# Patient Record
Sex: MALE | Race: WHITE | NOT HISPANIC OR LATINO | Employment: OTHER | URBAN - METROPOLITAN AREA
[De-identification: names, ages, dates, MRNs, and addresses within clinical notes are randomized per-mention and may not be internally consistent; named-entity substitution may affect disease eponyms.]

---

## 2024-07-01 ENCOUNTER — HOSPITAL ENCOUNTER (EMERGENCY)
Facility: HOSPITAL | Age: 64
Discharge: HOME | End: 2024-07-01
Attending: STUDENT IN AN ORGANIZED HEALTH CARE EDUCATION/TRAINING PROGRAM
Payer: MEDICAID

## 2024-07-01 VITALS
WEIGHT: 236.56 LBS | HEART RATE: 56 BPM | BODY MASS INDEX: 29.41 KG/M2 | SYSTOLIC BLOOD PRESSURE: 129 MMHG | TEMPERATURE: 98 F | DIASTOLIC BLOOD PRESSURE: 70 MMHG | OXYGEN SATURATION: 100 % | RESPIRATION RATE: 20 BRPM | HEIGHT: 75 IN

## 2024-07-01 DIAGNOSIS — R42 LIGHTHEADEDNESS: Primary | ICD-10-CM

## 2024-07-01 DIAGNOSIS — I48.0 PAROXYSMAL ATRIAL FIBRILLATION: ICD-10-CM

## 2024-07-01 DIAGNOSIS — R00.1 BRADYCARDIA: ICD-10-CM

## 2024-07-01 DIAGNOSIS — R79.89 ELEVATED SERUM CREATININE: ICD-10-CM

## 2024-07-01 LAB
ALBUMIN SERPL BCP-MCNC: 4.5 G/DL (ref 3.5–5.2)
ALP SERPL-CCNC: 76 U/L (ref 55–135)
ALT SERPL W/O P-5'-P-CCNC: 10 U/L (ref 10–44)
ANION GAP SERPL CALC-SCNC: 10 MMOL/L (ref 8–16)
AST SERPL-CCNC: 16 U/L (ref 10–40)
BASOPHILS # BLD AUTO: 0.05 K/UL (ref 0–0.2)
BASOPHILS NFR BLD: 0.5 % (ref 0–1.9)
BILIRUB SERPL-MCNC: 0.6 MG/DL (ref 0.1–1)
BNP SERPL-MCNC: 62 PG/ML (ref 0–99)
BUN SERPL-MCNC: 21 MG/DL (ref 8–23)
CALCIUM SERPL-MCNC: 9.8 MG/DL (ref 8.7–10.5)
CHLORIDE SERPL-SCNC: 105 MMOL/L (ref 95–110)
CO2 SERPL-SCNC: 24 MMOL/L (ref 23–29)
CREAT SERPL-MCNC: 1.7 MG/DL (ref 0.5–1.4)
DIFFERENTIAL METHOD BLD: ABNORMAL
EOSINOPHIL # BLD AUTO: 0.1 K/UL (ref 0–0.5)
EOSINOPHIL NFR BLD: 0.7 % (ref 0–8)
ERYTHROCYTE [DISTWIDTH] IN BLOOD BY AUTOMATED COUNT: 13.3 % (ref 11.5–14.5)
EST. GFR  (NO RACE VARIABLE): 45 ML/MIN/1.73 M^2
GLUCOSE SERPL-MCNC: 98 MG/DL (ref 70–110)
HCT VFR BLD AUTO: 42 % (ref 40–54)
HCV AB SERPL QL IA: NEGATIVE
HEP C VIRUS HOLD SPECIMEN: NORMAL
HGB BLD-MCNC: 14.3 G/DL (ref 14–18)
HIV 1+2 AB+HIV1 P24 AG SERPL QL IA: NEGATIVE
IMM GRANULOCYTES # BLD AUTO: 0.02 K/UL (ref 0–0.04)
IMM GRANULOCYTES NFR BLD AUTO: 0.2 % (ref 0–0.5)
INR PPP: 1 (ref 0.8–1.2)
LYMPHOCYTES # BLD AUTO: 0.9 K/UL (ref 1–4.8)
LYMPHOCYTES NFR BLD: 8.7 % (ref 18–48)
MCH RBC QN AUTO: 31.6 PG (ref 27–31)
MCHC RBC AUTO-ENTMCNC: 34 G/DL (ref 32–36)
MCV RBC AUTO: 93 FL (ref 82–98)
MONOCYTES # BLD AUTO: 0.5 K/UL (ref 0.3–1)
MONOCYTES NFR BLD: 4.8 % (ref 4–15)
NEUTROPHILS # BLD AUTO: 9 K/UL (ref 1.8–7.7)
NEUTROPHILS NFR BLD: 85.1 % (ref 38–73)
NRBC BLD-RTO: 0 /100 WBC
OHS QRS DURATION: 114 MS
OHS QTC CALCULATION: 477 MS
PLATELET # BLD AUTO: 243 K/UL (ref 150–450)
PMV BLD AUTO: 10.3 FL (ref 9.2–12.9)
POTASSIUM SERPL-SCNC: 4.2 MMOL/L (ref 3.5–5.1)
PROT SERPL-MCNC: 7.5 G/DL (ref 6–8.4)
PROTHROMBIN TIME: 11.7 SEC (ref 9–12.5)
RBC # BLD AUTO: 4.52 M/UL (ref 4.6–6.2)
SODIUM SERPL-SCNC: 139 MMOL/L (ref 136–145)
TROPONIN I SERPL DL<=0.01 NG/ML-MCNC: <0.006 NG/ML (ref 0–0.03)
WBC # BLD AUTO: 10.55 K/UL (ref 3.9–12.7)

## 2024-07-01 PROCEDURE — 83880 ASSAY OF NATRIURETIC PEPTIDE: CPT | Performed by: NURSE PRACTITIONER

## 2024-07-01 PROCEDURE — 87389 HIV-1 AG W/HIV-1&-2 AB AG IA: CPT | Performed by: EMERGENCY MEDICINE

## 2024-07-01 PROCEDURE — 99285 EMERGENCY DEPT VISIT HI MDM: CPT | Mod: 25

## 2024-07-01 PROCEDURE — 93005 ELECTROCARDIOGRAM TRACING: CPT

## 2024-07-01 PROCEDURE — 93010 ELECTROCARDIOGRAM REPORT: CPT | Mod: ,,, | Performed by: INTERNAL MEDICINE

## 2024-07-01 PROCEDURE — 86803 HEPATITIS C AB TEST: CPT | Performed by: EMERGENCY MEDICINE

## 2024-07-01 PROCEDURE — 80053 COMPREHEN METABOLIC PANEL: CPT | Performed by: NURSE PRACTITIONER

## 2024-07-01 PROCEDURE — 85610 PROTHROMBIN TIME: CPT | Performed by: NURSE PRACTITIONER

## 2024-07-01 PROCEDURE — 84484 ASSAY OF TROPONIN QUANT: CPT | Performed by: NURSE PRACTITIONER

## 2024-07-01 PROCEDURE — 85025 COMPLETE CBC W/AUTO DIFF WBC: CPT | Performed by: NURSE PRACTITIONER

## 2024-07-01 PROCEDURE — 25000003 PHARM REV CODE 250: Performed by: NURSE PRACTITIONER

## 2024-07-01 RX ORDER — AMIODARONE HYDROCHLORIDE 200 MG/1
200 TABLET ORAL
COMMUNITY

## 2024-07-01 RX ORDER — SODIUM CHLORIDE 9 MG/ML
1000 INJECTION, SOLUTION INTRAVENOUS
Status: COMPLETED | OUTPATIENT
Start: 2024-07-01 | End: 2024-07-01

## 2024-07-01 RX ORDER — APIXABAN 5 MG/1
5 TABLET, FILM COATED ORAL 2 TIMES DAILY
COMMUNITY

## 2024-07-01 RX ADMIN — SODIUM CHLORIDE 1000 ML: 9 INJECTION, SOLUTION INTRAVENOUS at 01:07

## 2024-07-01 NOTE — ED PROVIDER NOTES
"SCRIBE #1 NOTE: I, Yoni Jackie, am scribing for, and in the presence of, Ethan Barros MD. I have scribed the entire note.       History     Chief Complaint   Patient presents with    Loss of Consciousness     Near syncope today while shopping with "chest spasms". +sob and dizziness. Hx Afib. Takes Amiodarone and Eliquis daily.     Review of patient's allergies indicates:   Allergen Reactions    Penicillins          History of Present Illness     HPI    7/1/2024, 1:46 PM  History obtained from the patient      History of Present Illness: Jason Roth is a 64 y.o. male patient who presents to the Emergency Department for evaluation of LOC which onset gradually PTA. Pt had near syncopal episode today while shopping, he experienced dizziness/light headedness. Contrary to triage note pt denies any CP at this time, or during the event..  No mitigating or exacerbating factors reported. Patient denies any CP, SOB, and all other sxs at this time.  No further complaints or concerns at this time.       Arrival mode: Personal vehicle     PCP: No primary care provider on file.        Past Medical History:  No past medical history on file.    Past Surgical History:  No past surgical history on file.      Family History:  No family history on file.    Social History:  Social History     Tobacco Use    Smoking status: Not on file    Smokeless tobacco: Not on file   Substance and Sexual Activity    Alcohol use: Not on file    Drug use: Not on file    Sexual activity: Not on file        Review of Systems     Review of Systems   Constitutional:  Negative for fever.   HENT:  Negative for sore throat.    Respiratory:  Negative for shortness of breath.    Cardiovascular:  Negative for chest pain.   Gastrointestinal:  Negative for nausea.   Genitourinary:  Negative for dysuria.   Musculoskeletal:  Negative for back pain.   Skin:  Negative for rash.   Neurological:  Positive for dizziness and light-headedness. Negative for " "weakness.   Hematological:  Does not bruise/bleed easily.        Physical Exam     Initial Vitals [07/01/24 1133]   BP Pulse Resp Temp SpO2   (!) 95/56 (!) 59 18 98 °F (36.7 °C) 98 %      MAP       --          Physical Exam  Nursing Notes and Vital Signs Reviewed.  Constitutional: Patient is in no acute distress. Well-developed and well-nourished.  Head: Atraumatic. Normocephalic.  Eyes: PERRL. EOM intact. Conjunctivae are not pale.   ENT: Mucous membranes are moist. Oropharynx is clear and symmetric.    Neck: Supple. Full ROM.   Cardiovascular: Regular rate. Regular rhythm. No murmurs, rubs, or gallops. Distal pulses are 2+ and symmetric.  Pulmonary/Chest: No respiratory distress. Clear to auscultation bilaterally. No wheezing or rales.  Abdominal: Soft and non-distended.  There is no tenderness.  No rebound, guarding, or rigidity.   Musculoskeletal: Moves all extremities. No obvious deformities. No edema. No calf tenderness.  Skin: Warm and dry.  Neurological:  Alert, awake, and appropriate.  Normal speech.  No acute focal neurological deficits are appreciated. 5/5 strength in all 4 extremities, no sensory deficit in all 4 extremities. No nystagmus or facial droop, speech is clear and fluid. No ataxia, patient ambulates with a steady gait. GCS 15, NIH 0.       ED Course   Procedures  ED Vital Signs:  Vitals:    07/01/24 1133 07/01/24 1351 07/01/24 1352 07/01/24 1402   BP: (!) 95/56 (!) 143/62  128/62   Pulse: (!) 59 62 60 (!) 58   Resp: 18  20 20   Temp: 98 °F (36.7 °C)      TempSrc: Oral      SpO2: 98% 99% 99% 100%   Weight: 107.3 kg (236 lb 8.9 oz)      Height:        07/01/24 1416 07/01/24 1502 07/01/24 1503   BP:  129/70    Pulse:   (!) 56   Resp:   20   Temp:      TempSrc:      SpO2:   100%   Weight:      Height: 6' 3" (1.905 m)         Abnormal Lab Results:  Labs Reviewed   CBC W/ AUTO DIFFERENTIAL - Abnormal; Notable for the following components:       Result Value    RBC 4.52 (*)     MCH 31.6 (*)     Gran " # (ANC) 9.0 (*)     Lymph # 0.9 (*)     Gran % 85.1 (*)     Lymph % 8.7 (*)     All other components within normal limits    Narrative:     Release to patient->Immediate   COMPREHENSIVE METABOLIC PANEL - Abnormal; Notable for the following components:    Creatinine 1.7 (*)     eGFR 45 (*)     All other components within normal limits    Narrative:     Release to patient->Immediate   HIV 1 / 2 ANTIBODY    Narrative:     Release to patient->Immediate   HEPATITIS C ANTIBODY    Narrative:     Release to patient->Immediate   HEP C VIRUS HOLD SPECIMEN    Narrative:     Release to patient->Immediate   TROPONIN I    Narrative:     Release to patient->Immediate   B-TYPE NATRIURETIC PEPTIDE    Narrative:     Release to patient->Immediate   PROTIME-INR    Narrative:     Release to patient->Immediate        All Lab Results:  Results for orders placed or performed during the hospital encounter of 07/01/24   HIV 1/2 Ag/Ab (4th Gen)   Result Value Ref Range    HIV 1/2 Ag/Ab Negative Negative   Hepatitis C Antibody   Result Value Ref Range    Hepatitis C Ab Negative Negative   HCV Virus Hold Specimen   Result Value Ref Range    HEP C Virus Hold Specimen Hold for HCV sendout    CBC auto differential   Result Value Ref Range    WBC 10.55 3.90 - 12.70 K/uL    RBC 4.52 (L) 4.60 - 6.20 M/uL    Hemoglobin 14.3 14.0 - 18.0 g/dL    Hematocrit 42.0 40.0 - 54.0 %    MCV 93 82 - 98 fL    MCH 31.6 (H) 27.0 - 31.0 pg    MCHC 34.0 32.0 - 36.0 g/dL    RDW 13.3 11.5 - 14.5 %    Platelets 243 150 - 450 K/uL    MPV 10.3 9.2 - 12.9 fL    Immature Granulocytes 0.2 0.0 - 0.5 %    Gran # (ANC) 9.0 (H) 1.8 - 7.7 K/uL    Immature Grans (Abs) 0.02 0.00 - 0.04 K/uL    Lymph # 0.9 (L) 1.0 - 4.8 K/uL    Mono # 0.5 0.3 - 1.0 K/uL    Eos # 0.1 0.0 - 0.5 K/uL    Baso # 0.05 0.00 - 0.20 K/uL    nRBC 0 0 /100 WBC    Gran % 85.1 (H) 38.0 - 73.0 %    Lymph % 8.7 (L) 18.0 - 48.0 %    Mono % 4.8 4.0 - 15.0 %    Eosinophil % 0.7 0.0 - 8.0 %    Basophil % 0.5 0.0 - 1.9  %    Differential Method Automated    Comprehensive metabolic panel   Result Value Ref Range    Sodium 139 136 - 145 mmol/L    Potassium 4.2 3.5 - 5.1 mmol/L    Chloride 105 95 - 110 mmol/L    CO2 24 23 - 29 mmol/L    Glucose 98 70 - 110 mg/dL    BUN 21 8 - 23 mg/dL    Creatinine 1.7 (H) 0.5 - 1.4 mg/dL    Calcium 9.8 8.7 - 10.5 mg/dL    Total Protein 7.5 6.0 - 8.4 g/dL    Albumin 4.5 3.5 - 5.2 g/dL    Total Bilirubin 0.6 0.1 - 1.0 mg/dL    Alkaline Phosphatase 76 55 - 135 U/L    AST 16 10 - 40 U/L    ALT 10 10 - 44 U/L    eGFR 45 (A) >60 mL/min/1.73 m^2    Anion Gap 10 8 - 16 mmol/L   Troponin I #1   Result Value Ref Range    Troponin I <0.006 0.000 - 0.026 ng/mL   BNP   Result Value Ref Range    BNP 62 0 - 99 pg/mL   Protime-INR   Result Value Ref Range    Prothrombin Time 11.7 9.0 - 12.5 sec    INR 1.0 0.8 - 1.2   EKG 12-lead   Result Value Ref Range    QRS Duration 114 ms    [facility] QTC Calculation 477 ms         Imaging Results:  Imaging Results              X-Ray Chest AP Portable (Final result)  Result time 07/01/24 11:52:28   Procedure changed from X-Ray Chest PA And Lateral     Final result by Wojciech King MD (07/01/24 11:52:28)                   Impression:      As above      Electronically signed by: Wojciech King  Date:    07/01/2024  Time:    11:52               Narrative:    EXAMINATION:  XR CHEST AP PORTABLE    CLINICAL HISTORY:  chest pain;Chest Pain;    TECHNIQUE:  Single frontal view of the chest was performed.    COMPARISON:  None    FINDINGS:  Question subtle left basilar opacity.  Consider lateral chest radiograph.  Right lung is clear.    The cardiac silhouette is normal in size. The hilar and mediastinal contours are unremarkable.    Bones are intact.                                       The EKG was ordered, reviewed, and independently interpreted by the ED provider.  Interpretation time: 11:34  Rate: 62 BPM  Rhythm: normal sinus rhythm  Interpretation: Right atrial enlargement Rightward  axis Incomplete right bundle branch block Cannot rule out Anterior infarct ,age undetermined. No STEMI.             The Emergency Provider reviewed the vital signs and test results, which are outlined above.     ED Discussion     3:05 PM: Reassessed pt at this time. Discussed with pt all pertinent ED information and results. Discussed pt dx and plan of tx. Gave pt all f/u and return to the ED instructions. All questions and concerns were addressed at this time. Pt expresses understanding of information and instructions, and is comfortable with plan to discharge. Pt is stable for discharge.    I discussed with patient and/or family/caretaker that evaluation in the ED does not suggest any emergent or life threatening medical conditions requiring immediate intervention beyond what was provided in the ED, and I believe patient is safe for discharge.  Regardless, an unremarkable evaluation in the ED does not preclude the development or presence of a serious of life threatening condition. As such, patient was instructed to return immediately for any worsening or change in current symptoms.      ED Course as of 07/01/24 1557   Mon Jul 01, 2024   1347 WBC: 10.55 [MC]   1347 Hemoglobin: 14.3 [MC]   1348 X-Ray Chest AP Portable  My independent interpretation of cxr reveals left lower lobe opacity, no pneumothorax, no effusion. [MC]   1425 Troponin I: <0.006 [MC]   1425 BNP: 62 [MC]   1453 EKG 12-lead  Normal sinus rhythm.  Patient with no chest pain or shortness of breath.   [MC]   1511 Patient has no complaints at this time.  He states his lightheadedness has completely resolved.  The patient got 1 L of fluids for his slightly elevated creatinine.  Negative troponin, no chest pain at any point.  Negative BNP.  Patient's breath sounds are clear bilaterally.  Chest x-ray demonstrates no acute abnormality.  Patient has a normal sinus rhythm.  He feels comfortable being discharged home at this time. [MC]   1514 Cardiac Monitor  Interpretation:  This is my independent interpretation.   Rate: 58  Rhythm: sinus bradycardia  Indication:  Patient with near syncopal episode, history of atrial fibrillation.   [MC]      ED Course User Index  [MC] Ethan Barros MD     Medical Decision Making  Differential diagnosis includes ACS, atrial fibrillation, electrolyte abnormality, dehydration, acute kidney injury, hypoglycemia, medication side effects, among others.    Patient presents to the emergency department as documented.  Patient's workup here in the ER is unremarkable.  Patient has no elevation in his troponin.  He never had chest pain during or after the event.  This was directly addressed with the patient after reading the triage note, he denies the chest pain on my evaluation.  Patient is in normal sinus rhythm, it is possible he had an intermittent run of atrial fibrillation at the time, but at the present he is in normal sinus rhythm.  He is currently anticoagulated.  Patient is slightly bradycardic here, does not take any beta blockers at home.  He has a slight elevation in his creatinine at 1.7, patient states he worked in the heat all day yesterday, this is likely a result of that.  Given fluids here in the emergency department.  At time of discharge, patient with no complaints at all.  No orthostatic symptoms at time of discharge.  He feels comfortable going home at this time.  No indication for repeat troponin given the lack of chest pain.    Amount and/or Complexity of Data Reviewed  External Data Reviewed: notes.     Details: Reviewed a note from 03/25/2024 documenting the patient's hospital encounter with a cardiologist at which time the patient had paroxysmal atrial fibrillation, had a GIRISH performed with an electrical cardioversion.  This note also confirmed that the patient is on Eliquis.  This note contributed to my medical decision-making today.  Labs: ordered. Decision-making details documented in ED Course.  Radiology:  ordered and independent interpretation performed. Decision-making details documented in ED Course.  ECG/medicine tests: ordered and independent interpretation performed. Decision-making details documented in ED Course.    Risk  OTC drugs.  Prescription drug management.  Risk Details: This patient's presentation, plan of care, and medical decision-making were significantly impacted by paroxysmal atrial fibrillation on Eliquis                  ED Medication(s):  Medications   0.9%  NaCl infusion (0 mLs Intravenous Stopped 7/1/24 135)       Discharge Medication List as of 7/1/2024  3:14 PM                  Scribe Attestation:   Scribe #1: I performed the above scribed service and the documentation accurately describes the services I performed. I attest to the accuracy of the note.     Attending:   Physician Attestation Statement for Scribe #1: I, Ethan Barros MD, personally performed the services described in this documentation, as scribed by Yoni Mejia, in my presence, and it is both accurate and complete.           Clinical Impression       ICD-10-CM ICD-9-CM   1. Lightheadedness  R42 780.4   2. Elevated serum creatinine  R79.89 790.99   3. Bradycardia  R00.1 427.89   4. Paroxysmal atrial fibrillation  I48.0 427.31       Disposition:   Disposition: Discharged  Condition: Stable         Ethan Barros MD  07/01/24 4457

## 2024-07-01 NOTE — DISCHARGE INSTRUCTIONS
Please follow-up with your PCP. Please call on the next business day to schedule this appointment.    Continue to monitor your symptoms! Return for new/worsening symptoms.     Ensure that you are drinking enough water! This can lead to dehydration and symptoms of lightheadedness and dizziness.

## 2024-07-01 NOTE — FIRST PROVIDER EVALUATION
Medical screening examination initiated.  I have conducted a focused provider triage encounter, findings are as follows:    Brief history of present illness:  64-year-old male who presents to ER complaining of dizziness, shortness of breath, and chest pain that started just prior to arrival.  History of AFib.    Vitals:    07/01/24 1133   BP: (!) 95/56   BP Location: Right arm   Patient Position: Sitting   Pulse: (!) 59   Resp: 18   Temp: 98 °F (36.7 °C)   TempSrc: Oral   SpO2: 98%   Weight: 107.3 kg (236 lb 8.9 oz)       Pertinent physical exam:  Hypotensive in triage.    Brief workup plan:  Cardiac workup    Preliminary workup initiated; this workup will be continued and followed by the physician or advanced practice provider that is assigned to the patient when roomed.

## 2025-02-08 ENCOUNTER — NURSE TRIAGE (OUTPATIENT)
Dept: ADMINISTRATIVE | Facility: CLINIC | Age: 65
End: 2025-02-08
Payer: MEDICAID

## 2025-02-08 NOTE — TELEPHONE ENCOUNTER
HCC coding opportunities       Chart reviewed, no opportunity found: CHART REVIEWED, NO OPPORTUNITY FOUND        Patients Insurance     Medicare Insurance: Medicare           Pt is calling and he has a heart monitoring that was given to him by his Cardiologist and he is supposed to wear it until Monday and the monitor is saying that the battery is too low. Pt states that he has no charging capabilities and is inquiring what to do.  Pt does not have any doctors within the [facility] network for me to call to inquire about his monitor. Pt. States he wants to try and change the batteries and see if that will work. Advised to call back with any further concerns.   Reason for Disposition   General information question, no triage required and triager able to answer question    Protocols used: Information Only Call - No Triage-A-